# Patient Record
(demographics unavailable — no encounter records)

---

## 2025-03-10 NOTE — IMAGING
[de-identified] : LEFT HAND skin intact. mild swelling of MF. no TTP. good EPL, FPL. other fingers good extension, flex to full fist. good finger abduction and adduction. SILT median, ulnar, radial distributions. palpable radial pulse, brisk cap refill all digits. + catching of MF.  @3/10/25 XRAYS OF LEFT MIDDLE FINGER (3 views - PA, OBLIQUE, AND LATERAL VIEWS): no acute displaced fracture or dislocation. 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   RIGHT HAND skin intact. mild swelling of MF. no TTP. good EPL, FPL. other fingers good extension, flex to full fist. good finger abduction and adduction. SILT median, ulnar, radial distributions. palpable radial pulse, brisk cap refill all digits. + catching of MF.  @3/10/25 XRAYS OF RIGHT MIDDLE FINGER (3 views - PA, OBLIQUE, AND LATERAL VIEWS): no acute displaced fracture or dislocation. 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

## 2025-03-10 NOTE — ASSESSMENT
[FreeTextEntry1] : The condition was explained to the patient.  Discussed risks and benefits of treatment options for tenosynovitis - activity modification, NSAID, splint, steroid injection, or surgery. Patient would like to proceed with CSI for trigger finger. - Discussed risks, benefits, and alternatives as well as contents of injection. Risks include, but are not limited allergic reaction, flare reaction, injection site pain, bruising, numbness, increased blood sugar, elevated blood pressure, facial flushing, skin discoloration, fat atrophy, tendon rupture, and infection. Risk of immune suppression and increased susceptibility to infection with steroid use. We discussed that too many injections may lead to weakening of the tendon and tendon rupture. Patient expressed understanding and would like to proceed with injection. - The skin over the LEFT middle finger A1 pulley was cleansed with alcohol and anesthetized with ethyl chloride. The flexor sheath was injected with 3mg of celestone, 0.5cc of 1% lidocaine. Site was dressed with a band-aid. Patient tolerated the procedure well. - The skin over the RIGHT middle finger A1 pulley was cleansed with alcohol and anesthetized with ethyl chloride. The flexor sheath was injected with 3mg of celestone, 0.5cc of 1% lidocaine. Site was dressed with a band-aid. Patient tolerated the procedure well. - discussed that it may take up to 1 week for symptoms to improve after CSI.  F/u PRN.

## 2025-03-10 NOTE — HISTORY OF PRESENT ILLNESS
[Full time] : Work status: full time [de-identified] : 3/10/25: 56yo male (RHDRaúl Estrada) presents for bilateral middle finger locking after doing extensive yardwork ~9 months ago.  Hx: CAD s/p stent (2023) - on Clopidogrel. HTN. [FreeTextEntry5] : Patient Candelario Avilez 55 y male presents with bilateral middle finger locking for 9 months that started after yard work. No hand pain. No treatment or prior surgery.